# Patient Record
Sex: MALE | Race: WHITE | NOT HISPANIC OR LATINO | Employment: FULL TIME | URBAN - METROPOLITAN AREA
[De-identification: names, ages, dates, MRNs, and addresses within clinical notes are randomized per-mention and may not be internally consistent; named-entity substitution may affect disease eponyms.]

---

## 2018-06-13 VITALS — HEIGHT: 73 IN | WEIGHT: 215 LBS | BODY MASS INDEX: 28.49 KG/M2

## 2018-06-19 ENCOUNTER — OFFICE VISIT (OUTPATIENT)
Dept: FAMILY MEDICINE | Facility: CLINIC | Age: 38
End: 2018-06-19
Payer: COMMERCIAL

## 2018-06-19 VITALS
RESPIRATION RATE: 16 BRPM | WEIGHT: 215 LBS | DIASTOLIC BLOOD PRESSURE: 88 MMHG | BODY MASS INDEX: 28.49 KG/M2 | OXYGEN SATURATION: 98 % | HEIGHT: 73 IN | HEART RATE: 79 BPM | SYSTOLIC BLOOD PRESSURE: 134 MMHG

## 2018-06-19 DIAGNOSIS — R10.9 FLANK PAIN: ICD-10-CM

## 2018-06-19 DIAGNOSIS — R31.9 HEMATURIA, UNSPECIFIED TYPE: Primary | ICD-10-CM

## 2018-06-19 DIAGNOSIS — Z00.00 PREVENTATIVE HEALTH CARE: ICD-10-CM

## 2018-06-19 PROBLEM — E83.59 CALCIUM OXALATE CALCULUS: Status: ACTIVE | Noted: 2018-06-19

## 2018-06-19 LAB
BILIRUB UR QL STRIP: NEGATIVE
GLUCOSE UR QL STRIP: NEGATIVE
KETONES UR QL STRIP: NEGATIVE
LEUKOCYTE ESTERASE UR QL STRIP: NEGATIVE
PH, POC UA: 8
POC BLOOD, URINE: NEGATIVE
POC NITRATES, URINE: NEGATIVE
PROT UR QL STRIP: NEGATIVE
SP GR UR STRIP: 1.01 (ref 1–1.03)
UROBILINOGEN UR STRIP-ACNC: NORMAL (ref 0.3–2.2)

## 2018-06-19 PROCEDURE — 3008F BODY MASS INDEX DOCD: CPT | Mod: ,,, | Performed by: NURSE PRACTITIONER

## 2018-06-19 PROCEDURE — 81003 URINALYSIS AUTO W/O SCOPE: CPT | Mod: QW,,, | Performed by: NURSE PRACTITIONER

## 2018-06-19 PROCEDURE — 99213 OFFICE O/P EST LOW 20 MIN: CPT | Mod: 25,,, | Performed by: NURSE PRACTITIONER

## 2018-06-19 NOTE — PROGRESS NOTES
SUBJECTIVE:      Patient ID: Chnimay Stoddard is a 38 y.o. male.    Chief Complaint: Hematuria    Patient states about 2 weeks ago he urinated a blood clot, no pain at that time. Then a week ago he noticed blood in his urine again and had a little burning with urination. Has not had the symptoms in the past few days. He states this happed to him about 2 years ago and he was told he may have had a kidney stone, no imaging was done at that time. Patient would also like to have routine labs done      Dysuria    This is a new problem. The current episode started 1 to 4 weeks ago. The problem occurs intermittently. The problem has been unchanged. The quality of the pain is described as burning. There has been no fever. Associated symptoms include flank pain. Pertinent negatives include no chills, hematuria, urgency or vomiting. He has tried increased fluids for the symptoms. The treatment provided no relief. His past medical history is significant for kidney stones.       Past Surgical History:   Procedure Laterality Date    reduction of fracture open right femur with ida placement        Family History   Problem Relation Age of Onset    Family history unknown: Yes      Social History     Social History    Marital status: Single     Spouse name: N/A    Number of children: N/A    Years of education: N/A     Social History Main Topics    Smoking status: Never Smoker    Smokeless tobacco: Never Used    Alcohol use Yes    Drug use: No    Sexual activity: Yes     Partners: Female     Other Topics Concern    None     Social History Narrative    None     No current outpatient prescriptions on file.     No current facility-administered medications for this visit.      Review of patient's allergies indicates:  No Known Allergies   History reviewed. No pertinent past medical history.  Past Surgical History:   Procedure Laterality Date    reduction of fracture open right femur with ida placement          Review of  "Systems   Constitutional: Negative for appetite change, chills, diaphoresis, fever and unexpected weight change.   HENT: Negative for ear discharge, facial swelling, hearing loss, nosebleeds and trouble swallowing.    Eyes: Negative for photophobia, pain and visual disturbance.   Respiratory: Negative for apnea and choking.    Cardiovascular: Negative for palpitations.   Gastrointestinal: Negative for blood in stool and vomiting.   Endocrine: Negative for polyphagia.   Genitourinary: Positive for dysuria and flank pain. Negative for difficulty urinating, discharge, hematuria, penile pain, scrotal swelling and urgency.   Musculoskeletal: Negative for gait problem and joint swelling.   Skin: Negative for pallor.   Neurological: Negative for seizures and speech difficulty.   Hematological: Does not bruise/bleed easily.   Psychiatric/Behavioral: Negative for agitation and confusion.      OBJECTIVE:      Vitals:    06/19/18 0812 06/19/18 0827   BP: (!) 144/92 134/88   Pulse: 79    Resp: 16    SpO2: 98%    Weight: 97.5 kg (215 lb)    Height: 6' 1" (1.854 m)      Physical Exam   Constitutional: He is oriented to person, place, and time. He appears well-developed and well-nourished.   HENT:   Head: Atraumatic.   Eyes: Conjunctivae are normal.   Neck: Neck supple.   Cardiovascular: Normal rate, regular rhythm, normal heart sounds and intact distal pulses.    Pulmonary/Chest: Effort normal and breath sounds normal.   Abdominal: Soft. Bowel sounds are normal. He exhibits no distension. There is no hepatosplenomegaly. There is no tenderness. There is no CVA tenderness.   Musculoskeletal: Normal range of motion.   Lymphadenopathy:     He has no cervical adenopathy.   Neurological: He is alert and oriented to person, place, and time.   Skin: Skin is warm and dry. No rash noted.   Psychiatric: He has a normal mood and affect.   Nursing note and vitals reviewed.     Assessment:       1. Hematuria, unspecified type    2. Flank pain  "   3. Preventative health care        Plan:       Hematuria, unspecified type  -     POCT Urinalysis, Dipstick, Automated, W/O Scope  -     Urine culture; Future; Expected date: 06/19/2018  -     CT Abdomen Pelvis  Without Contrast; Future; Expected date: 06/19/2018  -     PSA, Screening; Future; Expected date: 06/19/2018  Increase fluids    Preventative health care  -     CBC auto differential; Future; Expected date: 06/19/2018  -     Comprehensive metabolic panel; Future; Expected date: 06/19/2018  -     Lipid panel; Future; Expected date: 06/19/2018  -     TSH; Future; Expected date: 06/19/2018    Flank pain  -     Urinalysis w/reflex to Microscopic        Follow-up in about 2 weeks (around 7/3/2018) for wellness.      6/19/2018 PEEWEE Vance, FNP

## 2018-06-19 NOTE — PATIENT INSTRUCTIONS
Blood in the Urine    Blood in the urine (hematuria) has many possible causes. If it occurs after an injury (such as a car accident or fall), it is most often a sign of bruising to the kidney or bladder. Common causes of blood in the urine include urinary tract infections, kidney stones, inflammation, tumors, or certain other diseases of the kidney or bladder. Menstruation can cause blood to appear in the urine sample, although it is not coming from the urinary tract.  If only a trace amount of blood is present, it will show up on the urine test, even though the urine may be yellow and not pink or red. This may occur with any of the above conditions, as well as heavy exercise or high fever. In this case, your doctor may want to repeat the urine test on another day. This will show if the blood is still present. If it is, then other tests can be done to find out the cause.  Home care  Follow these home care guidelines:  · If your urine does not appear bloody (pink, brown or red) then you do not need to restrict your activity in any way.  · If you can see blood in your urine, rest and avoid heavy exertion until your next exam. Do not use aspirin, blood thinners, or anti-platelet or anti-inflammatory medicines. These include ibuprofen and naproxen. These thin the blood and may increase bleeding.  Follow-up care  Follow up with your healthcare provider, or as advised. If you were injured and had blood in your urine, you should have a repeat urine test in 1 to 2 days. Contact your doctor for this test.  A radiologist will review any X-rays that were taken. You will be told of any new findings that may affect your care.  When to seek medical advice  Call your healthcare provider right away if any of these occur:  · Bright red blood or blood clots in the urine (if you did not have this before)  · Weakness, dizziness or fainting  · New groin, abdominal, or back pain  · Fever of 100.4ºF (38ºC) or higher, or as directed by  your healthcare provider  · Repeated vomiting  · Bleeding from the nose or gums or easy bruising  Date Last Reviewed: 9/1/2016  © 0289-6381 [employer]. [address] [place] [address], [place] [address]. All rights reserved. This information is not intended as a substitute for professional medical care. Always follow your healthcare professional's instructions.

## 2018-06-23 LAB
ALBUMIN SERPL-MCNC: 4.9 G/DL (ref 3.1–4.7)
ALP SERPL-CCNC: 58 IU/L (ref 40–104)
ALT (SGPT): 23 IU/L (ref 3–33)
AST SERPL-CCNC: 20 IU/L (ref 10–40)
BASOPHILS NFR BLD: 0 K/UL (ref 0–0.2)
BASOPHILS NFR BLD: 0.4 %
BILIRUB SERPL-MCNC: 1 MG/DL (ref 0.3–1)
BUN SERPL-MCNC: 15 MG/DL (ref 8–20)
CALCIUM SERPL-MCNC: 9.4 MG/DL (ref 7.7–10.4)
CHLORIDE: 101 MMOL/L (ref 98–110)
CO2 SERPL-SCNC: 30.8 MMOL/L (ref 22.8–31.6)
COMPLEXED PSA SERPL-MCNC: 0.8 NG/ML (ref 0–3)
CREATININE: 0.78 MG/DL (ref 0.6–1.4)
EOSINOPHIL NFR BLD: 0.1 K/UL (ref 0–0.7)
EOSINOPHIL NFR BLD: 1.6 %
ERYTHROCYTE [DISTWIDTH] IN BLOOD BY AUTOMATED COUNT: 12 % (ref 11.7–14.9)
GLUCOSE: 103 MG/DL (ref 70–99)
GRAN #: 3.6 K/UL (ref 1.4–6.5)
GRAN%: 65.3 %
HCT VFR BLD AUTO: 47.6 % (ref 39–55)
HGB BLD-MCNC: 16.4 G/DL (ref 14–16)
IMMATURE GRANS (ABS): 0 K/UL (ref 0–1)
IMMATURE GRANULOCYTES: 0.4 %
LIPASE SERPL-CCNC: 29 U/L (ref 0–38)
LYMPH #: 1.3 K/UL (ref 1.2–3.4)
LYMPH%: 23.5 %
MCH RBC QN AUTO: 30.1 PG (ref 25–35)
MCHC RBC AUTO-ENTMCNC: 34.5 G/DL (ref 31–36)
MCV RBC AUTO: 87.5 FL (ref 80–100)
MONO #: 0.5 K/UL (ref 0.1–0.6)
MONO%: 8.8 %
NUCLEATED RBCS: 0 %
PLATELET # BLD AUTO: 168 K/UL (ref 140–440)
PMV BLD AUTO: 10 FL (ref 8.8–12.7)
POTASSIUM SERPL-SCNC: 4.7 MMOL/L (ref 3.5–5)
PROT SERPL-MCNC: 7.6 G/DL (ref 6–8.2)
RBC # BLD AUTO: 5.44 M/UL (ref 4.3–5.9)
SODIUM: 141 MMOL/L (ref 134–144)
TSH SERPL DL<=0.005 MIU/L-ACNC: 3.46 ULU/ML (ref 0.3–5.6)
WBC # BLD AUTO: 5.6 K/UL (ref 5–10)

## 2018-06-26 ENCOUNTER — TELEPHONE (OUTPATIENT)
Dept: FAMILY MEDICINE | Facility: CLINIC | Age: 38
End: 2018-06-26

## 2018-06-26 NOTE — TELEPHONE ENCOUNTER
----- Message from Denny Rangel NP sent at 6/26/2018 12:58 PM CDT -----  Neg CT of abdomen, will review at upcoming OV

## 2018-07-19 ENCOUNTER — DOCUMENTATION ONLY (OUTPATIENT)
Dept: INTERNAL MEDICINE | Facility: CLINIC | Age: 38
End: 2018-07-19

## 2019-04-11 ENCOUNTER — OFFICE VISIT (OUTPATIENT)
Dept: FAMILY MEDICINE | Facility: CLINIC | Age: 39
End: 2019-04-11
Payer: COMMERCIAL

## 2019-04-11 VITALS
SYSTOLIC BLOOD PRESSURE: 128 MMHG | DIASTOLIC BLOOD PRESSURE: 84 MMHG | HEIGHT: 73 IN | BODY MASS INDEX: 27.7 KG/M2 | WEIGHT: 209 LBS | TEMPERATURE: 99 F | OXYGEN SATURATION: 97 % | HEART RATE: 107 BPM

## 2019-04-11 DIAGNOSIS — J06.9 UPPER RESPIRATORY TRACT INFECTION, UNSPECIFIED TYPE: ICD-10-CM

## 2019-04-11 DIAGNOSIS — J02.9 PHARYNGITIS, UNSPECIFIED ETIOLOGY: Primary | ICD-10-CM

## 2019-04-11 LAB
CTP QC/QA: YES
S PYO RRNA THROAT QL PROBE: NEGATIVE

## 2019-04-11 PROCEDURE — 3008F BODY MASS INDEX DOCD: CPT | Mod: ,,, | Performed by: NURSE PRACTITIONER

## 2019-04-11 PROCEDURE — 99213 OFFICE O/P EST LOW 20 MIN: CPT | Mod: 25,,, | Performed by: NURSE PRACTITIONER

## 2019-04-11 PROCEDURE — 3008F PR BODY MASS INDEX (BMI) DOCUMENTED: ICD-10-PCS | Mod: ,,, | Performed by: NURSE PRACTITIONER

## 2019-04-11 PROCEDURE — 87880 STREP A ASSAY W/OPTIC: CPT | Mod: QW,,, | Performed by: NURSE PRACTITIONER

## 2019-04-11 PROCEDURE — 96372 PR INJECTION,THERAP/PROPH/DIAG2ST, IM OR SUBCUT: ICD-10-PCS | Mod: ,,, | Performed by: NURSE PRACTITIONER

## 2019-04-11 PROCEDURE — 96372 THER/PROPH/DIAG INJ SC/IM: CPT | Mod: ,,, | Performed by: NURSE PRACTITIONER

## 2019-04-11 PROCEDURE — 87880 POCT RAPID STREP A: ICD-10-PCS | Mod: QW,,, | Performed by: NURSE PRACTITIONER

## 2019-04-11 PROCEDURE — 99213 PR OFFICE/OUTPT VISIT, EST, LEVL III, 20-29 MIN: ICD-10-PCS | Mod: 25,,, | Performed by: NURSE PRACTITIONER

## 2019-04-11 RX ORDER — DEXAMETHASONE SODIUM PHOSPHATE 4 MG/ML
8 INJECTION, SOLUTION INTRA-ARTICULAR; INTRALESIONAL; INTRAMUSCULAR; INTRAVENOUS; SOFT TISSUE
Status: COMPLETED | OUTPATIENT
Start: 2019-04-11 | End: 2019-04-11

## 2019-04-11 RX ORDER — CEFDINIR 300 MG/1
300 CAPSULE ORAL 2 TIMES DAILY
Qty: 20 CAPSULE | Refills: 0 | Status: SHIPPED | OUTPATIENT
Start: 2019-04-11 | End: 2019-04-21

## 2019-04-11 RX ORDER — BENZONATATE 100 MG/1
100 CAPSULE ORAL 3 TIMES DAILY PRN
Qty: 30 CAPSULE | Refills: 0 | Status: SHIPPED | OUTPATIENT
Start: 2019-04-11 | End: 2019-04-21

## 2019-04-11 RX ADMIN — DEXAMETHASONE SODIUM PHOSPHATE 8 MG: 4 INJECTION, SOLUTION INTRA-ARTICULAR; INTRALESIONAL; INTRAMUSCULAR; INTRAVENOUS; SOFT TISSUE at 02:04

## 2019-04-11 NOTE — PATIENT INSTRUCTIONS

## 2019-04-11 NOTE — PROGRESS NOTES
SUBJECTIVE:      Patient ID: Chinmay Stoddard is a 38 y.o. male.    Chief Complaint: Sore Throat; Sinus Problem; and Cough    Mr Stoddard is here today complaining of sore throat and productive cough for 2 days. He lost his voice yesterday, says it is a little better today.     Sore Throat    This is a new problem. The current episode started in the past 7 days. The problem has been gradually worsening. Neither side of throat is experiencing more pain than the other. There has been no fever. Associated symptoms include congestion and coughing. Pertinent negatives include no abdominal pain, ear discharge, ear pain, headaches, shortness of breath, trouble swallowing or vomiting. He has had exposure to strep. He has tried acetaminophen for the symptoms. The treatment provided mild relief.   Cough   This is a new problem. The current episode started in the past 7 days. The problem has been gradually worsening. The cough is productive of sputum. Associated symptoms include nasal congestion, postnasal drip and a sore throat. Pertinent negatives include no chest pain, chills, ear congestion, ear pain, fever, headaches, shortness of breath or wheezing. The symptoms are aggravated by lying down. He has tried nothing for the symptoms.       Past Surgical History:   Procedure Laterality Date    reduction of fracture open right femur with ida placement        Family History   Family history unknown: Yes      Social History     Socioeconomic History    Marital status: Single     Spouse name: Not on file    Number of children: Not on file    Years of education: Not on file    Highest education level: Not on file   Occupational History    Not on file   Social Needs    Financial resource strain: Not on file    Food insecurity:     Worry: Not on file     Inability: Not on file    Transportation needs:     Medical: Not on file     Non-medical: Not on file   Tobacco Use    Smoking status: Never Smoker    Smokeless  tobacco: Never Used   Substance and Sexual Activity    Alcohol use: Yes    Drug use: No    Sexual activity: Yes     Partners: Female   Lifestyle    Physical activity:     Days per week: Not on file     Minutes per session: Not on file    Stress: Not at all   Relationships    Social connections:     Talks on phone: Not on file     Gets together: Not on file     Attends Christian service: Not on file     Active member of club or organization: Not on file     Attends meetings of clubs or organizations: Not on file     Relationship status: Not on file   Other Topics Concern    Not on file   Social History Narrative    Not on file     Current Outpatient Medications   Medication Sig Dispense Refill    benzonatate (TESSALON) 100 MG capsule Take 1 capsule (100 mg total) by mouth 3 (three) times daily as needed. 30 capsule 0    cefdinir (OMNICEF) 300 MG capsule Take 1 capsule (300 mg total) by mouth 2 (two) times daily. for 10 days 20 capsule 0     Current Facility-Administered Medications   Medication Dose Route Frequency Provider Last Rate Last Dose    dexamethasone injection 8 mg  8 mg Intramuscular 1 time in Clinic/HOD Denny Rangel NP         Review of patient's allergies indicates:  No Known Allergies   History reviewed. No pertinent past medical history.  Past Surgical History:   Procedure Laterality Date    reduction of fracture open right femur with ida placement          Review of Systems   Constitutional: Negative for appetite change, chills, fever and unexpected weight change.   HENT: Positive for congestion, postnasal drip and sore throat. Negative for ear discharge, ear pain, facial swelling, hearing loss, nosebleeds and trouble swallowing.    Eyes: Negative for photophobia, pain and visual disturbance.   Respiratory: Positive for cough. Negative for apnea, choking, shortness of breath and wheezing.    Cardiovascular: Negative for chest pain and palpitations.   Gastrointestinal: Negative for  "abdominal pain, blood in stool and vomiting.   Endocrine: Negative for polyphagia.   Genitourinary: Negative for difficulty urinating and hematuria.   Musculoskeletal: Negative for gait problem and joint swelling.   Skin: Negative for pallor.   Neurological: Negative for dizziness, seizures, speech difficulty and headaches.   Hematological: Does not bruise/bleed easily.   Psychiatric/Behavioral: Negative for agitation and confusion.      OBJECTIVE:      Vitals:    04/11/19 1345 04/11/19 1416   BP: (!) 124/92 128/84   Pulse: 107    Temp: 98.7 °F (37.1 °C)    TempSrc: Oral    SpO2: 97%    Weight: 94.8 kg (209 lb)    Height: 6' 1" (1.854 m)      Physical Exam   Constitutional: He is oriented to person, place, and time. He appears well-developed and well-nourished.   HENT:   Head: Atraumatic.   Right Ear: Tympanic membrane is scarred.   Left Ear: Tympanic membrane is scarred.   Nose: Mucosal edema present.   Mouth/Throat: Posterior oropharyngeal erythema present. No tonsillar exudate.   Eyes: Conjunctivae are normal.   Neck: Neck supple.   Cardiovascular: Normal rate, regular rhythm, normal heart sounds and intact distal pulses.   Pulmonary/Chest: Effort normal and breath sounds normal. He has no wheezes. He has no rhonchi. He has no rales.   Abdominal: Soft. Bowel sounds are normal. He exhibits no distension. There is no tenderness.   Musculoskeletal: Normal range of motion.   Neurological: He is alert and oriented to person, place, and time.   Skin: Skin is warm and dry.   Psychiatric: He has a normal mood and affect. His speech is normal and behavior is normal.   Nursing note and vitals reviewed.     Assessment:       1. Pharyngitis, unspecified etiology    2. Upper respiratory tract infection, unspecified type        Plan:       Pharyngitis, unspecified etiology  -     POCT rapid strep A                          Neg  Upper respiratory tract infection, unspecified type  -    start cefdinir (OMNICEF) 300 MG capsule; " Take 1 capsule (300 mg total) by mouth 2 (two) times daily. for 10 days  Dispense: 20 capsule; Refill: 0  -   start  benzonatate (TESSALON) 100 MG capsule; Take 1 capsule (100 mg total) by mouth 3 (three) times daily as needed.  Dispense: 30 capsule; Refill: 0  -     dexamethasone injection 8 mg  ,Symptoms are most likely due to viral infection.  Advised to take OTC medications such as tylenol and motrin for fever, antihistamine and/or decongestant for nasal symptoms.  Monitor blood pressure if a decongestant is used.  Get plenty of rest and fluids to maintain hydration. Gargle with warm salt water if sore throat is present.  An antibiotic Rx was provided in case symptoms worsen or due not resolve within 5-7 days.       Follow up if symptoms worsen or fail to improve.      4/11/2019 PEEWEE Vance, FNP

## 2023-12-21 ENCOUNTER — OFFICE VISIT (OUTPATIENT)
Dept: URGENT CARE | Facility: CLINIC | Age: 43
End: 2023-12-21
Payer: COMMERCIAL

## 2023-12-21 VITALS
OXYGEN SATURATION: 96 % | HEART RATE: 76 BPM | BODY MASS INDEX: 27.83 KG/M2 | WEIGHT: 210 LBS | HEIGHT: 73 IN | RESPIRATION RATE: 16 BRPM | DIASTOLIC BLOOD PRESSURE: 84 MMHG | SYSTOLIC BLOOD PRESSURE: 123 MMHG | TEMPERATURE: 99 F

## 2023-12-21 DIAGNOSIS — Z23 ENCOUNTER FOR IMMUNIZATION: ICD-10-CM

## 2023-12-21 DIAGNOSIS — S61.012A LACERATION OF LEFT THUMB WITHOUT FOREIGN BODY WITHOUT DAMAGE TO NAIL, INITIAL ENCOUNTER: Primary | ICD-10-CM

## 2023-12-21 PROCEDURE — 90471 IMMUNIZATION ADMIN: CPT | Mod: S$GLB,,, | Performed by: NURSE PRACTITIONER

## 2023-12-21 PROCEDURE — 90471 TD VACCINE GREATER THAN OR EQUAL TO 7YO WITH PRESERVATIVE IM: ICD-10-PCS | Mod: S$GLB,,, | Performed by: NURSE PRACTITIONER

## 2023-12-21 PROCEDURE — 99203 PR OFFICE/OUTPT VISIT, NEW, LEVL III, 30-44 MIN: ICD-10-PCS | Mod: 25,S$GLB,, | Performed by: NURSE PRACTITIONER

## 2023-12-21 PROCEDURE — 90714 TD VACCINE GREATER THAN OR EQUAL TO 7YO WITH PRESERVATIVE IM: ICD-10-PCS | Mod: S$GLB,,, | Performed by: NURSE PRACTITIONER

## 2023-12-21 PROCEDURE — 99203 OFFICE O/P NEW LOW 30 MIN: CPT | Mod: 25,S$GLB,, | Performed by: NURSE PRACTITIONER

## 2023-12-21 PROCEDURE — 90714 TD VACC NO PRESV 7 YRS+ IM: CPT | Mod: S$GLB,,, | Performed by: NURSE PRACTITIONER

## 2023-12-21 RX ORDER — MULTIVITAMIN
1 TABLET ORAL DAILY
COMMUNITY

## 2023-12-21 NOTE — PROGRESS NOTES
"CHIEF COMPLAINT  Chief Complaint   Patient presents with    Laceration       HPI  Chinmay Soto a 43 y.o. male who presents for tetanus immunization.  Patient reports he cut his left thumb this morning with a saw.  Patient reports his mother is a retired nursing with her house and she provided wound care with dressing to left thumb.  Patient states "I do not need any stitches, I just want a tetanus immunization." Patient reports last tetanus has been over 10 years.  Patient denies numbness, tingling, decreased sensation or decreased range of motion.      CURRENT MEDICATIONS  Current Outpatient Medications on File Prior to Visit   Medication Sig Dispense Refill    multivitamin (THERAGRAN) per tablet Take 1 tablet by mouth once daily.       No current facility-administered medications on file prior to visit.       ALLERGIES  Review of patient's allergies indicates:  No Known Allergies    There is no immunization history for the selected administration types on file for this patient.    PAST MEDICAL HISTORY  History reviewed. No pertinent past medical history.    SURGICAL HISTORY  Past Surgical History:   Procedure Laterality Date    reduction of fracture open right femur with ida placement          SOCIAL HISTORY  Social History     Socioeconomic History    Marital status: Single   Tobacco Use    Smoking status: Never    Smokeless tobacco: Never   Substance and Sexual Activity    Alcohol use: Yes    Drug use: No    Sexual activity: Yes     Partners: Female     Social Determinants of Health     Stress: No Stress Concern Present (4/11/2019)    [employer] - Occupational Stress Questionnaire     Feeling of Stress : Not at all       FAMILY HISTORY  Family History   Family history unknown: Yes       REVIEW OF SYSTEMS  Constitutional: No fever, chills, or weakness.  Respiratory: No cough, wheezing or shortness of breath  Cardiovascular: No chest pain, palpitations or edema  Musculoskeletal: " No pain, full range of motion. Good sensation  Skin: laceration to left thumb  Neurologic: No focal weakness or sensory changes.  All systems otherwise negative except as noted in the Review of Systems and History of Present Illness      PHYSICAL EXAM  Reviewed Triage Note  VITAL SIGNS: 123/84  Constitutional: Well developed, well nourished, Alert and oriented x3, No acute distress, non-toxic appearance.  Respiratory: Normal breath sounds, no respiratory distress, no wheezing, no rhonchi, no rales  Cardiovascular: HR 76, normal rhythm, no murmurs, no rubs, no gallops.  Musculoskeletal: No edema, tenderness to left thumb, no cyanosis, no clubbing. Good range of motion in all major joints.   Integument: Warm, Dry, small, laceration to left thumb with nail involvement. No active bleeding appreciated  Neurologic: Normal motor function, normal sensory function. No focal deficits noted. Intact distal pulses  Psychiatric: Affect normal, judgment normal, mood normal            MEDICAL DECISION MAKING    Physical exam findings discussed with patient. Recommended laceration repair, pt refused.  Will dispo home with instructions to follow up with PCP tomorrow. Tetanus immunization given. Pt agrees with plan of care.     DISPOSITION  Patient discharged in stable condition       CLINICAL IMPRESSION:  The primary encounter diagnosis was Laceration of left thumb without foreign body without damage to nail, initial encounter. A diagnosis of Encounter for immunization was also pertinent to this visit.    Patient advised to follow-up with your PCP within 3 days for BP re-check if Blood Pressure was >120/80 without history of hypertension.

## 2023-12-21 NOTE — PROGRESS NOTES
"Subjective:      Patient ID: Chinmay Stoddard is a 43 y.o. male.    Vitals:  height is 6' 1" (1.854 m) and weight is 95.3 kg (210 lb). His temperature is 98.5 °F (36.9 °C). His blood pressure is 123/84 and his pulse is 76. His respiration is 16 and oxygen saturation is 96%.     Chief Complaint: Laceration    Laceration   The incident occurred 1 to 3 hours ago. Pain location: L thumb. The laceration mechanism was a metal edge. The pain is at a severity of 5/10. It is unknown if a foreign body is present. His tetanus status is out of date.       Skin:  Positive for laceration.      Objective:     Physical Exam    Assessment:     No diagnosis found.    Plan:       There are no diagnoses linked to this encounter.                "